# Patient Record
Sex: FEMALE | Race: OTHER
[De-identification: names, ages, dates, MRNs, and addresses within clinical notes are randomized per-mention and may not be internally consistent; named-entity substitution may affect disease eponyms.]

---

## 2017-12-06 ENCOUNTER — APPOINTMENT (OUTPATIENT)
Dept: SURGICAL ONCOLOGY | Facility: CLINIC | Age: 82
End: 2017-12-06
Payer: MEDICARE

## 2017-12-06 VITALS
DIASTOLIC BLOOD PRESSURE: 70 MMHG | HEIGHT: 60 IN | BODY MASS INDEX: 26.5 KG/M2 | HEART RATE: 67 BPM | RESPIRATION RATE: 14 BRPM | WEIGHT: 135 LBS | OXYGEN SATURATION: 97 % | SYSTOLIC BLOOD PRESSURE: 153 MMHG

## 2017-12-06 PROCEDURE — 99214 OFFICE O/P EST MOD 30 MIN: CPT

## 2017-12-13 ENCOUNTER — APPOINTMENT (OUTPATIENT)
Dept: CT IMAGING | Facility: IMAGING CENTER | Age: 82
End: 2017-12-13

## 2019-02-19 ENCOUNTER — APPOINTMENT (OUTPATIENT)
Dept: SURGICAL ONCOLOGY | Facility: CLINIC | Age: 84
End: 2019-02-19
Payer: MEDICARE

## 2019-02-19 VITALS
WEIGHT: 133 LBS | HEIGHT: 60 IN | DIASTOLIC BLOOD PRESSURE: 86 MMHG | SYSTOLIC BLOOD PRESSURE: 165 MMHG | TEMPERATURE: 98.1 F | HEART RATE: 51 BPM | OXYGEN SATURATION: 99 % | BODY MASS INDEX: 26.11 KG/M2

## 2019-02-19 PROCEDURE — 99214 OFFICE O/P EST MOD 30 MIN: CPT

## 2019-02-19 NOTE — ASSESSMENT
[FreeTextEntry1] : IMP: \par MEAGAN - Hx T3N0 Gastric CA 2012\par New symptoms\par \par Plan:\par CT abdomen and pelvis now\par Bloodwork now\par RTO yearly

## 2019-02-19 NOTE — REASON FOR VISIT
[Follow-Up Visit] : a follow-up visit for [Gastric Cancer] : gastric cancer [Family Member] : family member

## 2019-02-19 NOTE — PHYSICAL EXAM
[Normal] : supple, no neck mass and thyroid not enlarged [Normal Supraclavicular Lymph Nodes] : normal supraclavicular lymph nodes [Normal Groin Lymph Nodes] : normal groin lymph nodes [Normal] : oriented to person, place and time, with appropriate affect [de-identified] : midline scar but no masses or tenderness

## 2019-02-19 NOTE — HISTORY OF PRESENT ILLNESS
[de-identified] : 83 year-old female presents for follow up.  She was last seen in December 2017. 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  \par \par Most recent CT Abdomen/Pelvis performed in December 2017 demonstrated no evidence of recurrent or metastatic disease.\par Last EGD and colonoscopy in 4/2017 by Dr. Rodriguez (GI) was negative as per patient.\par \par LFTs (12/2017): WNL\par \par Recently the pt. has been having intermittent episodes of nausea and some vomiting.\par

## 2019-02-21 LAB
CANCER AG19-9 SERPL-ACNC: 9 U/ML
CEA SERPL-MCNC: 3.2 NG/ML

## 2020-03-03 ENCOUNTER — APPOINTMENT (OUTPATIENT)
Dept: SURGICAL ONCOLOGY | Facility: CLINIC | Age: 85
End: 2020-03-03
Payer: MEDICARE

## 2020-03-03 VITALS
WEIGHT: 128 LBS | SYSTOLIC BLOOD PRESSURE: 150 MMHG | BODY MASS INDEX: 25.13 KG/M2 | HEIGHT: 60 IN | HEART RATE: 66 BPM | DIASTOLIC BLOOD PRESSURE: 76 MMHG

## 2020-03-03 PROCEDURE — 99214 OFFICE O/P EST MOD 30 MIN: CPT

## 2020-03-03 NOTE — PHYSICAL EXAM
[Normal] : supple, no neck mass and thyroid not enlarged [Normal Supraclavicular Lymph Nodes] : normal supraclavicular lymph nodes [Normal Groin Lymph Nodes] : normal groin lymph nodes [Normal] : grossly intact [de-identified] : midline scar but no masses or tenderness

## 2020-03-03 NOTE — ASSESSMENT
[FreeTextEntry1] : IMP: \par MEAGAN - Hx T3N0 Gastric CA 2012\par \par Plan:\par Bloodwork now\par RTO yearly with bloodwork\par colonoscopy 2022

## 2020-03-03 NOTE — HISTORY OF PRESENT ILLNESS
[de-identified] : 84 year-old female presents for follow up.  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  \par \par Most recent CT Abdomen/Pelvis performed in February 2019 demonstrated cholelithiasis but no evidence of recurrent or metastatic disease.\par \par Last EGD and colonoscopy in 4/2017 by Dr. Rodriguez (GI) was negative as per patient.  She does not know when and if she should get any additional endoscopic evaluations. \par \par Labs 2/2019:\par CA 19-9: 9 U/ml\par CEA: 3.2 ng/ml\par \par She states that she will occasionally vomit if she eats too much in one sitting.  She reports occasional epigastric pain (she is taking omeprazole).  She is moving her bowels without difficulty and her weight is stable. \par

## 2020-03-04 LAB
ALBUMIN SERPL ELPH-MCNC: 4.1 G/DL
ALP BLD-CCNC: 84 U/L
ALT SERPL-CCNC: 15 U/L
AST SERPL-CCNC: 22 U/L
BILIRUB DIRECT SERPL-MCNC: 0.1 MG/DL
BILIRUB INDIRECT SERPL-MCNC: 0.1 MG/DL
BILIRUB SERPL-MCNC: 0.2 MG/DL
CANCER AG19-9 SERPL-ACNC: 9 U/ML
CEA SERPL-MCNC: 2.9 NG/ML
PROT SERPL-MCNC: 7.2 G/DL

## 2021-03-23 ENCOUNTER — LABORATORY RESULT (OUTPATIENT)
Age: 86
End: 2021-03-23

## 2021-03-23 ENCOUNTER — APPOINTMENT (OUTPATIENT)
Dept: SURGICAL ONCOLOGY | Facility: CLINIC | Age: 86
End: 2021-03-23
Payer: MEDICARE

## 2021-03-23 VITALS
WEIGHT: 128 LBS | HEIGHT: 60 IN | HEART RATE: 66 BPM | BODY MASS INDEX: 25.13 KG/M2 | SYSTOLIC BLOOD PRESSURE: 169 MMHG | OXYGEN SATURATION: 99 % | DIASTOLIC BLOOD PRESSURE: 67 MMHG | TEMPERATURE: 98.3 F

## 2021-03-23 PROCEDURE — 99214 OFFICE O/P EST MOD 30 MIN: CPT

## 2021-03-23 NOTE — PHYSICAL EXAM
[Normal] : supple, no neck mass and thyroid not enlarged [Normal Neck Lymph Nodes] : normal neck lymph nodes  [Normal Supraclavicular Lymph Nodes] : normal supraclavicular lymph nodes [Normal Groin Lymph Nodes] : normal groin lymph nodes [Normal Axillary Lymph Nodes] : normal axillary lymph nodes [Normal] : oriented to person, place and time, with appropriate affect [de-identified] : No masses or tenderness

## 2021-03-23 NOTE — HISTORY OF PRESENT ILLNESS
[de-identified] : Ema Garcia is an 85 year-old female who presents for follow up.  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  \par \par Most recent CT Abdomen/Pelvis performed in February 2019 demonstrated cholelithiasis but no evidence of recurrent or metastatic disease.\par \par Last EGD and colonoscopy in 4/2017 by Dr. Rodriguez (GI) was negative as per patient.  She does not know when and if she should get any additional endoscopic evaluations. \par \par Labs 3/2020:\par CA 19-9: 9 U/ml\par CEA: 2.9 ng/ml\par LFT's: WNL \par \par She states that she will occasionally vomit if she eats too much in one sitting.  She reports occasional epigastric pain (she is taking omeprazole).  She is moving her bowels without difficulty and her weight is stable. \par

## 2021-03-24 LAB
ALBUMIN SERPL ELPH-MCNC: 4.1 G/DL
ALP BLD-CCNC: 70 U/L
ALT SERPL-CCNC: 15 U/L
AST SERPL-CCNC: 24 U/L
BASOPHILS # BLD AUTO: 0.04 K/UL
BASOPHILS NFR BLD AUTO: 0.6 %
BILIRUB DIRECT SERPL-MCNC: 0.1 MG/DL
BILIRUB INDIRECT SERPL-MCNC: 0.2 MG/DL
BILIRUB SERPL-MCNC: 0.3 MG/DL
CANCER AG19-9 SERPL-ACNC: 9 U/ML
CEA SERPL-MCNC: 3.6 NG/ML
EOSINOPHIL # BLD AUTO: 0.05 K/UL
EOSINOPHIL NFR BLD AUTO: 0.8 %
HCT VFR BLD CALC: 39 %
HGB BLD-MCNC: 12 G/DL
IMM GRANULOCYTES NFR BLD AUTO: 0.2 %
LYMPHOCYTES # BLD AUTO: 2.91 K/UL
LYMPHOCYTES NFR BLD AUTO: 44.8 %
MAN DIFF?: NORMAL
MCHC RBC-ENTMCNC: 30.8 GM/DL
MCHC RBC-ENTMCNC: 32.3 PG
MCV RBC AUTO: 104.8 FL
MONOCYTES # BLD AUTO: 0.74 K/UL
MONOCYTES NFR BLD AUTO: 11.4 %
NEUTROPHILS # BLD AUTO: 2.74 K/UL
NEUTROPHILS NFR BLD AUTO: 42.2 %
PLATELET # BLD AUTO: 154 K/UL
PROT SERPL-MCNC: 7.6 G/DL
RBC # BLD: 3.72 M/UL
RBC # FLD: 13.1 %
WBC # FLD AUTO: 6.49 K/UL

## 2022-03-29 ENCOUNTER — APPOINTMENT (OUTPATIENT)
Dept: SURGICAL ONCOLOGY | Facility: CLINIC | Age: 87
End: 2022-03-29
Payer: MEDICARE

## 2022-03-29 VITALS
HEART RATE: 57 BPM | HEIGHT: 60 IN | SYSTOLIC BLOOD PRESSURE: 162 MMHG | DIASTOLIC BLOOD PRESSURE: 71 MMHG | WEIGHT: 115 LBS | BODY MASS INDEX: 22.58 KG/M2

## 2022-03-29 PROCEDURE — 99214 OFFICE O/P EST MOD 30 MIN: CPT

## 2022-03-29 NOTE — HISTORY OF PRESENT ILLNESS
[de-identified] : Ema Garcia is an 86 year-old female who presents for follow up.  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  \par \par CT Abdomen/Pelvis performed in February 2019 demonstrated cholelithiasis but no evidence of recurrent or metastatic disease.\par \par Last EGD and colonoscopy in 4/2017 by Dr. Rodriguez (GI) was negative as per patient.  She does not know when and if she should get any additional endoscopic evaluations. \par \par Labs 3/2020:\par CA 19-9: 9 U/ml\par CEA: 2.9 ng/ml\par LFT's: WNL \par \par She states that she will occasionally vomit if she eats too much in one sitting.  She reports occasional epigastric pain (she is taking omeprazole).  She is moving her bowels without difficulty and her weight is stable. \par \par 3/24/21: CEA 3.6 ng/mL, Ca 19-9: 9 U/mL, LFT: WNL\par \par CT C/A/P on 3/21/22 revealed 3 mm subpleural density within the left upper lobe. 12 x 8 mm groundglass density within the superior segment of the right lower lobe. [employer] pulmonary nodule recommendations 2017 guidelines solitary pure ground-glass nodule size 6mm follow up CT at 6-12 months, then every 2 yrs until 5 yrs. No significant adenopathy is suspected within the chest, abdomen or pelvis.

## 2022-03-29 NOTE — ASSESSMENT
[FreeTextEntry1] : IMP: \par MEAGAN - Hx T3N0 Gastric CA 2012\par ground glass area lung\par \par Plan:\par \par RTO yearly with bloodwork and CT of chest abdomen and pelvis\par colonoscopy deferred because of age

## 2022-03-29 NOTE — PHYSICAL EXAM
[Normal] : supple, no neck mass and thyroid not enlarged [Normal Supraclavicular Lymph Nodes] : normal supraclavicular lymph nodes [Normal Groin Lymph Nodes] : normal groin lymph nodes [Normal] : oriented to person, place and time, with appropriate affect [de-identified] : no masses or hepatomegaly

## 2023-04-04 ENCOUNTER — APPOINTMENT (OUTPATIENT)
Dept: SURGICAL ONCOLOGY | Facility: CLINIC | Age: 88
End: 2023-04-04
Payer: MEDICARE

## 2023-04-04 VITALS
HEIGHT: 60 IN | HEART RATE: 57 BPM | WEIGHT: 115 LBS | DIASTOLIC BLOOD PRESSURE: 69 MMHG | SYSTOLIC BLOOD PRESSURE: 129 MMHG | BODY MASS INDEX: 22.58 KG/M2

## 2023-04-04 DIAGNOSIS — C16.9 MALIGNANT NEOPLASM OF STOMACH, UNSPECIFIED: ICD-10-CM

## 2023-04-04 PROCEDURE — 99214 OFFICE O/P EST MOD 30 MIN: CPT

## 2023-04-04 NOTE — PHYSICAL EXAM
[Normal] : supple, no neck mass and thyroid not enlarged [Normal Supraclavicular Lymph Nodes] : normal supraclavicular lymph nodes [Normal Groin Lymph Nodes] : normal groin lymph nodes [Normal] : oriented to person, place and time, with appropriate affect [de-identified] : midline incision without any masses or tenderness

## 2023-04-04 NOTE — ASSESSMENT
[FreeTextEntry1] : IMP: \par MEAGAN - Hx T3N0 Gastric CA 2012\par ground glass area lung\par \par Plan:\par RTO yearly with CT of chest abdomen and pelvis  and BW \par

## 2023-04-04 NOTE — HISTORY OF PRESENT ILLNESS
[de-identified] : Ema Garcia is an 86 year-old female who presents for follow up.  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  \par \par CT Abdomen/Pelvis performed in February 2019 demonstrated cholelithiasis but no evidence of recurrent or metastatic disease.\par \par Last EGD and colonoscopy in 4/2017 by Dr. Rodriguez (GI) was negative as per patient.  She does not know when and if she should get any additional endoscopic evaluations. \par \par Labs 3/2020:\par CA 19-9: 9 U/ml\par CEA: 2.9 ng/ml\par LFT's: WNL \par \par She states that she will occasionally vomit if she eats too much in one sitting.  She reports occasional epigastric pain (she is taking omeprazole).  She is moving her bowels without difficulty and her weight is stable. \par \par 3/24/21: CEA 3.6 ng/mL, Ca 19-9: 9 U/mL, LFT: WNL\par \par CT C/A/P on 3/21/22 revealed 3 mm subpleural density within the left upper lobe. 12 x 8 mm groundglass density within the superior segment of the right lower lobe. [employer] pulmonary nodule recommendations 2017 guidelines solitary pure ground-glass nodule size 6mm follow up CT at 6-12 months, then every 2 yrs until 5 yrs. No significant adenopathy is suspected within the chest, abdomen or pelvis.\par \par CT C/A/P 2023: pending\par BW: pending